# Patient Record
Sex: FEMALE | URBAN - METROPOLITAN AREA
[De-identification: names, ages, dates, MRNs, and addresses within clinical notes are randomized per-mention and may not be internally consistent; named-entity substitution may affect disease eponyms.]

---

## 2022-03-01 ENCOUNTER — HOSPITAL ENCOUNTER (OUTPATIENT)
Age: 67
Discharge: HOME | End: 2022-03-03

## 2022-03-01 PROCEDURE — 88305 TISSUE EXAM BY PATHOLOGIST: CPT

## 2022-03-01 PROCEDURE — 87081 CULTURE SCREEN ONLY: CPT

## 2022-03-01 PROCEDURE — 88342 IMHCHEM/IMCYTCHM 1ST ANTB: CPT

## 2022-03-02 LAB — CLOTEST: NORMAL

## 2023-01-03 ENCOUNTER — HOSPITAL ENCOUNTER (EMERGENCY)
Age: 68
Discharge: HOME | End: 2023-01-03
Payer: MEDICARE

## 2023-01-03 VITALS
SYSTOLIC BLOOD PRESSURE: 158 MMHG | OXYGEN SATURATION: 97 % | HEART RATE: 81 BPM | TEMPERATURE: 97 F | DIASTOLIC BLOOD PRESSURE: 98 MMHG | RESPIRATION RATE: 18 BRPM

## 2023-01-03 DIAGNOSIS — J06.9 URI WITH COUGH AND CONGESTION: Primary | ICD-10-CM

## 2023-01-03 DIAGNOSIS — H10.9 CONJUNCTIVITIS OF RIGHT EYE, UNSPECIFIED CONJUNCTIVITIS TYPE: ICD-10-CM

## 2023-01-03 PROCEDURE — 99211 OFF/OP EST MAY X REQ PHY/QHP: CPT

## 2023-01-03 RX ORDER — AMOXICILLIN AND CLAVULANATE POTASSIUM 875; 125 MG/1; MG/1
1 TABLET, FILM COATED ORAL 2 TIMES DAILY
Qty: 14 TABLET | Refills: 0 | Status: SHIPPED | OUTPATIENT
Start: 2023-01-03 | End: 2023-01-10

## 2023-01-03 RX ORDER — OFLOXACIN 3 MG/ML
2 SOLUTION/ DROPS OPHTHALMIC 4 TIMES DAILY
Qty: 5 ML | Refills: 0 | Status: SHIPPED | OUTPATIENT
Start: 2023-01-03 | End: 2023-01-10

## 2023-01-03 RX ORDER — HYDROCHLOROTHIAZIDE 12.5 MG/1
12.5 CAPSULE, GELATIN COATED ORAL DAILY
COMMUNITY

## 2023-01-03 RX ORDER — PREDNISONE 10 MG/1
TABLET ORAL
Qty: 14 TABLET | Refills: 0 | Status: SHIPPED | OUTPATIENT
Start: 2023-01-03

## 2023-01-03 ASSESSMENT — PAIN SCALES - GENERAL: PAINLEVEL_OUTOF10: 4

## 2023-01-03 ASSESSMENT — VISUAL ACUITY: OU: 1

## 2023-01-03 ASSESSMENT — PAIN DESCRIPTION - LOCATION: LOCATION: HEAD

## 2023-01-03 ASSESSMENT — PAIN - FUNCTIONAL ASSESSMENT: PAIN_FUNCTIONAL_ASSESSMENT: 0-10

## 2023-01-03 NOTE — DISCHARGE INSTRUCTIONS
Robitussin DM for cough and chest congestion  Allergy pill (ie.  Claritin) for runny nose  Stuffy nose : cough drop, saline spray, otc flonase, otc sudafed (use sparingly due to bp and pulse)

## 2023-01-03 NOTE — ED PROVIDER NOTES
[facility]  EMERGENCY DEPARTMENT ENCOUNTER        NAME: Penny Canales  :  1955  MRN:  [id number]  Date of evaluation: 1/3/2023  Provider: Mehdi Dan PA-C  PCP: Jean Carlos Contreras MD  Note Started : 8:25 AM EST 1/3/23    Chief Complaint: Pharyngitis (Headache  congestion right eye was red crusted cough)      This is a 28-year-old female the presents to urgent care complaining of 4-day history of sinus pain and pressure URI symptoms and a cough. She denies chest pain or shortness of breath. No abdominal pain nausea vomiting diarrhea or urinary symptoms. Also complains of redness and discharge to her right eye but denies any loss of vision or blurred vision. She states other people in her family have similar symptoms. On first contact patient she appears to be in no acute distress. Review of Systems  Pertinent positives and negatives are stated within HPI, all other systems reviewed and are negative. Allergies: Patient has no active allergies. --------------------------------------------- PAST HISTORY ---------------------------------------------  Past Medical History:  has no past medical history on file. Past Surgical History:  has a past surgical history that includes hemicolectomy; hernia repair; and joint replacement. Social History:  reports that she has never smoked. She has never used smokeless tobacco.    Family History: family history is not on file. The patients home medications have been reviewed. The nursing notes within the ED encounter have been reviewed.      ------------------------------------------------SCREENINGS----------------------------------------------                        CIWA Assessment  BP: (!) 158/98 (did not take  blood pressure meds today)  Heart Rate: 81           ---------------------------------------------PHYSICAL EXAM --------------------------------------------    Vitals:    23 5956   BP: (!) 158/98   Pulse: 81   Resp: 18   Temp: 97 °F (36.1 °C)   SpO2: 97%     Oxygen Saturation Interpretation: Normal     Physical Exam  Vitals and nursing note reviewed. Constitutional:       Appearance: She is well-developed. HENT:      Head: Normocephalic and atraumatic. Jaw: There is normal jaw occlusion. Right Ear: Hearing and external ear normal. Tympanic membrane is bulging. Left Ear: Hearing and external ear normal. Tympanic membrane is bulging. Nose: Congestion and rhinorrhea present. Right Sinus: Frontal sinus tenderness present. Left Sinus: Frontal sinus tenderness present. Mouth/Throat:      Mouth: Mucous membranes are moist. No angioedema. Pharynx: Uvula midline. Posterior oropharyngeal erythema present. No pharyngeal swelling, oropharyngeal exudate or uvula swelling. Tonsils: No tonsillar exudate. Comments: Oropharynx is patent. Eyes:      General: Lids are normal. Vision grossly intact. Gaze aligned appropriately. No allergic shiner, visual field deficit or scleral icterus. Extraocular Movements: Extraocular movements intact. Conjunctiva/sclera:      Right eye: Right conjunctiva is injected. Pupils: Pupils are equal, round, and reactive to light. Comments: No abrasion flare ulcer foreign body hyphema or hypopyon seen on exam   Cardiovascular:      Rate and Rhythm: Normal rate and regular rhythm. Heart sounds: Normal heart sounds. No murmur heard. Pulmonary:      Effort: Pulmonary effort is normal.      Breath sounds: Normal breath sounds. Abdominal:      General: Bowel sounds are normal.      Palpations: Abdomen is soft. Abdomen is not rigid. Tenderness: There is no abdominal tenderness. There is no guarding or rebound. Musculoskeletal:      Cervical back: Full passive range of motion without pain, normal range of motion and neck supple. Lymphadenopathy:      Cervical: No cervical adenopathy.    Skin:     General: Skin is warm and dry. Findings: No abrasion or rash. Neurological:      General: No focal deficit present. Mental Status: She is alert and oriented to person, place, and time. GCS: GCS eye subscore is 4. GCS verbal subscore is 5. GCS motor subscore is 6. Cranial Nerves: No cranial nerve deficit. Sensory: No sensory deficit. Coordination: Coordination normal.      Gait: Gait normal.       -------------------------------------------------- RESULTS -------------------------------------------------  No results found for this visit on 01/03/23. No orders to display       Labs Reviewed - No data to display    When ordered only abnormal lab results are displayed. All other labs were within normal range or not returned as of this dictation. Interpretation per the Radiologist below, if available at the time of this note:    No orders to display     No results found. No results found.     -------------------------------------------------PROCEDURES--------------------------------------------  Unless otherwise noted below, none      Procedures      ---EMERGENCY DEPARTMENT COURSE and DIFFERENTIAL DIAGNOSIS/MDM---  (CC/HPI Summary, DDx, ED Course, and Reassessment:) (Disposition Considerations (include 1 Tests not done, Admit vs D/C, Shared Decision Making, Pt Expectation of Test or Tx., Consults, Social Determinants, Chronic Conditiions, Records reviewed)    MDM  Number of Diagnoses or Management Options  Conjunctivitis of right eye, unspecified conjunctivitis type  URI with cough and congestion  Diagnosis management comments: This is a 59-year-old female in no acute distress. She has cough and cold symptoms sinus and conjunctivitis symptoms. The symptoms have been going on for the past 4 days.   She has tried some over-the-counter cough and cold medications we discussed over-the-counter medications also I will place her on an antibiotic for possible bacterial cause she will be on Augmentin antibiotic also an antibiotic eyedrop ofloxacin. Instructions given. DISCHARGE MEDICATIONS:  Discharge Medication List as of 1/3/2023  8:40 AM        START taking these medications    Details   ofloxacin (OCUFLOX) 0.3 % solution Place 2 drops into the right eye 4 times daily for 7 days, Disp-5 mL, R-0Normal      amoxicillin-clavulanate (AUGMENTIN) 875-125 MG per tablet Take 1 tablet by mouth 2 times daily for 7 days, Disp-14 tablet, R-0Normal      predniSONE (DELTASONE) 10 MG tablet Take 40mg by mouth daily x 2 days, then 20mg by mouth daily x 2 days then 10mg by mouth daily x 2 days. , Disp-14 tablet, R-0Normal             DISCONTINUED MEDICATIONS:  Discharge Medication List as of 1/3/2023  8:40 AM          PATIENT REFERRED TO:  Rubén Card MD  [facility], [address]  [phone]    Schedule an appointment as soon as possible for a visit       --------------------------------- ADDITIONAL PROVIDER NOTES ---------------------------------  I have spoken with the patient and discussed todays results, in addition to providing specific details for the plan of care and counseling regarding the diagnosis and prognosis. Their questions are answered at this time and they are agreeable with the plan. This patient is stable for discharge. I have shared the specific conditions for return, as well as the importance of follow-up. * NOTE: (Please note that portions of this note were completed with a voice recognition program.  Efforts were made to edit the dictations but occasionally words are mis-transcribed. )    --------------------------------- IMPRESSION AND DISPOSITION ---------------------------------    IMPRESSION  1. URI with cough and congestion    2.  Conjunctivitis of right eye, unspecified conjunctivitis type        DISPOSITION Decision To Discharge 01/03/2023 08:40:18 AM    Disposition: Discharge to home  Patient condition is good    I am the Primary Clinician of Record.      Lucas Olson PA-C (electronically signed) on 1/3/2023 at [address] [place], [place] [address]  01/03/23 1121

## 2024-03-16 ENCOUNTER — HOSPITAL ENCOUNTER (EMERGENCY)
Age: 69
Discharge: HOME | End: 2024-03-16
Payer: MEDICARE

## 2024-03-16 VITALS
HEIGHT: 66 IN | BODY MASS INDEX: 24.11 KG/M2 | DIASTOLIC BLOOD PRESSURE: 84 MMHG | WEIGHT: 150 LBS | SYSTOLIC BLOOD PRESSURE: 142 MMHG | OXYGEN SATURATION: 99 % | HEART RATE: 70 BPM | RESPIRATION RATE: 18 BRPM | TEMPERATURE: 97.7 F

## 2024-03-16 DIAGNOSIS — J06.9 VIRAL URI WITH COUGH: Primary | ICD-10-CM

## 2024-03-16 DIAGNOSIS — B97.89 ACUTE VIRAL SINUSITIS: ICD-10-CM

## 2024-03-16 DIAGNOSIS — J01.90 ACUTE VIRAL SINUSITIS: ICD-10-CM

## 2024-03-16 PROCEDURE — 99211 OFF/OP EST MAY X REQ PHY/QHP: CPT

## 2024-03-16 RX ORDER — CETIRIZINE HYDROCHLORIDE 10 MG/1
10 TABLET ORAL DAILY
Qty: 30 TABLET | Refills: 0 | Status: SHIPPED | OUTPATIENT
Start: 2024-03-16

## 2024-03-16 RX ORDER — FLUTICASONE PROPIONATE 50 MCG
2 SPRAY, SUSPENSION (ML) NASAL DAILY
Qty: 16 G | Refills: 0 | Status: SHIPPED | OUTPATIENT
Start: 2024-03-16

## 2024-03-16 RX ORDER — BENZONATATE 100 MG/1
100 CAPSULE ORAL 3 TIMES DAILY PRN
Qty: 21 CAPSULE | Refills: 0 | Status: SHIPPED | OUTPATIENT
Start: 2024-03-16 | End: 2024-03-23

## 2024-03-16 ASSESSMENT — PAIN - FUNCTIONAL ASSESSMENT: PAIN_FUNCTIONAL_ASSESSMENT: NONE - DENIES PAIN

## 2024-03-16 NOTE — DISCHARGE INSTRUCTIONS
Your symptoms are likely viral or environmental.  Rest and drink plenty of fluid, Tylenol ibuprofen for any pain.  Tessalon Perles 3 times daily as needed for cough, Zyrtec once daily, Flonase nasal spray 2 sprays each nostril daily.  If your symptoms do not improve over the next 1 week please follow-up with your PCP.

## 2024-03-16 NOTE — ED PROVIDER NOTES
[facility]  Department of Emergency Medicine     Encounter Note  Admit Date/RoomTime: 3/16/2024 12:08 PM   Room:     NAME: Giana Torres  : 1955  MRN: [id number]     Chief Complaint:  Cough and Nasal Congestion (Started 5 days ago)    History of Present Illness       Giana Torres is a 68 y.o. old female who presents to the urgent care presents with a 5-day history of nasal congestion, clear rhinorrhea, mild cough.  Patient states on the whole her symptoms have been improving.  States that she wanted to make sure that she was okay with her grandchildren are coming to visit.  Started taking some over-the-counter generic DayQuil yesterday with some improvement in symptoms.  Denies any fevers, chills, mucopurulent sputum production, mucopurulent rhinorrhea, chest pain, shortness of breath.  ROS   Pertinent positives and negatives are stated within HPI, all other systems reviewed and are negative.    Past Medical History:  has no past medical history on file.    Surgical History:  has a past surgical history that includes hemicolectomy; hernia repair; and joint replacement.    Social History:  reports that she has never smoked. She has never used smokeless tobacco. She reports current alcohol use. She reports that she does not use drugs.    Family History: family history is not on file.     Allergies: Patient has no known allergies.    Physical Exam   Oxygen Saturation Interpretation: Normal on room air analysis.        ED Triage Vitals [24 1121]   BP Temp Temp Source Pulse Respirations SpO2 Height Weight - Scale   (!) 142/101 97.7 °F (36.5 °C) Infrared 70 18 99 % 1.676 m (5' 6\") 68 kg (150 lb)         Constitutional:  Alert, development consistent with age.  Ears:  External Ears: Bilateral normal.               TM's & External Canals: normal TM's and external ear canals bilaterally.  Nose:   There is no discharge, swelling or lesions noted.  Sinuses: no Bilateral  Fax request:    Epic Prompts with this message when ordering:     -progesterone (PROMETRIUM) 100 MG capsule cannot be ordered as a prescription.   Zyrtec, Flonase and Tessalon Perles.  Discussed conditions requiring emergent reevaluation in the ER.  Discussed follow-up with PCP.      Assessment     1. Viral URI with cough    2. Acute viral sinusitis      Plan   Discharged home.  Patient condition is stable  Cl Torrez MD  [address], [address]  [facility] [address]  [phone]    Schedule an appointment as soon as possible for a visit in 1 week       New Medications     Discharge Medication List as of 3/16/2024 12:33 PM        START taking these medications    Details   cetirizine (ZYRTEC) 10 MG tablet Take 1 tablet by mouth daily, Disp-30 tablet, R-0Normal      fluticasone (FLONASE) 50 MCG/ACT nasal spray 2 sprays by Each Nostril route daily, Disp-16 g, R-0Normal      benzonatate (TESSALON PERLES) 100 MG capsule Take 1 capsule by mouth 3 times daily as needed for Cough, Disp-21 capsule, R-0Normal           Electronically signed by CYNDIE Cintron CNP   DD: 3/16/24  **This report was transcribed using voice recognition software. Every effort was made to ensure accuracy; however, inadvertent computerized transcription errors may be present.  END OF ED PROVIDER NOTE      Alix Moreno APRN - CNP  03/16/24 9325

## 2024-04-17 ENCOUNTER — APPOINTMENT (OUTPATIENT)
Dept: GENERAL RADIOLOGY | Age: 69
DRG: 603 | End: 2024-04-17
Payer: MEDICARE

## 2024-04-17 ENCOUNTER — HOSPITAL ENCOUNTER (INPATIENT)
Age: 69
LOS: 4 days | Discharge: HOME | DRG: 603 | End: 2024-04-21
Attending: STUDENT IN AN ORGANIZED HEALTH CARE EDUCATION/TRAINING PROGRAM | Admitting: INTERNAL MEDICINE
Payer: MEDICARE

## 2024-04-17 ENCOUNTER — HOSPITAL ENCOUNTER (EMERGENCY)
Age: 69
Discharge: HOME | DRG: 603 | End: 2024-04-17
Payer: MEDICARE

## 2024-04-17 VITALS
WEIGHT: 150 LBS | DIASTOLIC BLOOD PRESSURE: 82 MMHG | BODY MASS INDEX: 24.21 KG/M2 | HEART RATE: 80 BPM | TEMPERATURE: 98.4 F | SYSTOLIC BLOOD PRESSURE: 151 MMHG | RESPIRATION RATE: 16 BRPM | OXYGEN SATURATION: 99 %

## 2024-04-17 DIAGNOSIS — M71.121 SEPTIC OLECRANON BURSITIS OF RIGHT ELBOW: Primary | ICD-10-CM

## 2024-04-17 DIAGNOSIS — L03.119 CELLULITIS OF UPPER EXTREMITY, UNSPECIFIED LATERALITY: Primary | ICD-10-CM

## 2024-04-17 DIAGNOSIS — L03.113 CELLULITIS OF RIGHT UPPER EXTREMITY: ICD-10-CM

## 2024-04-17 PROBLEM — M70.21 OLECRANON BURSITIS OF RIGHT ELBOW: Status: ACTIVE | Noted: 2024-04-17

## 2024-04-17 LAB
ALBUMIN SERPL-MCNC: 4.7 G/DL (ref 3.5–5.2)
ALP SERPL-CCNC: 77 U/L (ref 35–104)
ALT SERPL-CCNC: 21 U/L (ref 0–32)
ANION GAP SERPL CALCULATED.3IONS-SCNC: 14 MMOL/L (ref 7–16)
AST SERPL-CCNC: 32 U/L (ref 0–31)
BASOPHILS # BLD: 0.05 K/UL (ref 0–0.2)
BASOPHILS # BLD: 0.05 K/UL (ref 0–0.2)
BASOPHILS NFR BLD: 0 % (ref 0–2)
BASOPHILS NFR BLD: 0 % (ref 0–2)
BILIRUB SERPL-MCNC: 0.6 MG/DL (ref 0–1.2)
BUN SERPL-MCNC: 9 MG/DL (ref 6–23)
CALCIUM SERPL-MCNC: 9.8 MG/DL (ref 8.6–10.2)
CHLORIDE SERPL-SCNC: 88 MMOL/L (ref 98–107)
CO2 SERPL-SCNC: 27 MMOL/L (ref 22–29)
CREAT SERPL-MCNC: 0.7 MG/DL (ref 0.5–1)
EOSINOPHIL # BLD: 0.01 K/UL (ref 0.05–0.5)
EOSINOPHIL # BLD: 0.01 K/UL (ref 0.05–0.5)
EOSINOPHILS RELATIVE PERCENT: 0 % (ref 0–6)
EOSINOPHILS RELATIVE PERCENT: 0 % (ref 0–6)
ERYTHROCYTE [DISTWIDTH] IN BLOOD BY AUTOMATED COUNT: 13 % (ref 11.5–15)
ERYTHROCYTE [DISTWIDTH] IN BLOOD BY AUTOMATED COUNT: 13 % (ref 11.5–15)
ERYTHROCYTE [SEDIMENTATION RATE] IN BLOOD BY WESTERGREN METHOD: 25 MM/HR (ref 0–20)
GFR SERPL CREATININE-BSD FRML MDRD: 89 ML/MIN/1.73M2
GLUCOSE SERPL-MCNC: 104 MG/DL (ref 74–99)
HCT VFR BLD AUTO: 36.5 % (ref 34–48)
HCT VFR BLD AUTO: 38.5 % (ref 34–48)
HGB BLD-MCNC: 12.5 G/DL (ref 11.5–15.5)
HGB BLD-MCNC: 13.5 G/DL (ref 11.5–15.5)
IMM GRANULOCYTES # BLD AUTO: 0.05 K/UL (ref 0–0.58)
IMM GRANULOCYTES # BLD AUTO: 0.07 K/UL (ref 0–0.58)
IMM GRANULOCYTES NFR BLD: 0 % (ref 0–5)
IMM GRANULOCYTES NFR BLD: 1 % (ref 0–5)
LACTATE BLDV-SCNC: 1.1 MMOL/L (ref 0.5–2.2)
LYMPHOCYTES NFR BLD: 1.51 K/UL (ref 1.5–4)
LYMPHOCYTES NFR BLD: 1.57 K/UL (ref 1.5–4)
LYMPHOCYTES RELATIVE PERCENT: 10 % (ref 20–42)
LYMPHOCYTES RELATIVE PERCENT: 11 % (ref 20–42)
MCH RBC QN AUTO: 30.8 PG (ref 26–35)
MCH RBC QN AUTO: 31.8 PG (ref 26–35)
MCHC RBC AUTO-ENTMCNC: 34.2 G/DL (ref 32–34.5)
MCHC RBC AUTO-ENTMCNC: 35.1 G/DL (ref 32–34.5)
MCV RBC AUTO: 89.9 FL (ref 80–99.9)
MCV RBC AUTO: 90.8 FL (ref 80–99.9)
MONOCYTES NFR BLD: 1.16 K/UL (ref 0.1–0.95)
MONOCYTES NFR BLD: 1.19 K/UL (ref 0.1–0.95)
MONOCYTES NFR BLD: 8 % (ref 2–12)
MONOCYTES NFR BLD: 8 % (ref 2–12)
NEUTROPHILS NFR BLD: 81 % (ref 43–80)
NEUTROPHILS NFR BLD: 81 % (ref 43–80)
NEUTS SEG NFR BLD: 11.81 K/UL (ref 1.8–7.3)
NEUTS SEG NFR BLD: 12.22 K/UL (ref 1.8–7.3)
PLATELET # BLD AUTO: 238 K/UL (ref 130–450)
PLATELET # BLD AUTO: 248 K/UL (ref 130–450)
PMV BLD AUTO: 10 FL (ref 7–12)
PMV BLD AUTO: 9.4 FL (ref 7–12)
POTASSIUM SERPL-SCNC: 3.3 MMOL/L (ref 3.5–5)
PROT SERPL-MCNC: 8.1 G/DL (ref 6.4–8.3)
RBC # BLD AUTO: 4.06 M/UL (ref 3.5–5.5)
RBC # BLD AUTO: 4.24 M/UL (ref 3.5–5.5)
SODIUM SERPL-SCNC: 129 MMOL/L (ref 132–146)
WBC OTHER # BLD: 14.7 K/UL (ref 4.5–11.5)
WBC OTHER # BLD: 15 K/UL (ref 4.5–11.5)

## 2024-04-17 PROCEDURE — 6370000000 HC RX 637 (ALT 250 FOR IP): Performed by: STUDENT IN AN ORGANIZED HEALTH CARE EDUCATION/TRAINING PROGRAM

## 2024-04-17 PROCEDURE — 6370000000 HC RX 637 (ALT 250 FOR IP): Performed by: INTERNAL MEDICINE

## 2024-04-17 PROCEDURE — 36415 COLL VENOUS BLD VENIPUNCTURE: CPT

## 2024-04-17 PROCEDURE — 2580000003 HC RX 258: Performed by: STUDENT IN AN ORGANIZED HEALTH CARE EDUCATION/TRAINING PROGRAM

## 2024-04-17 PROCEDURE — 99211 OFF/OP EST MAY X REQ PHY/QHP: CPT

## 2024-04-17 PROCEDURE — 6360000002 HC RX W HCPCS: Performed by: STUDENT IN AN ORGANIZED HEALTH CARE EDUCATION/TRAINING PROGRAM

## 2024-04-17 PROCEDURE — 99285 EMERGENCY DEPT VISIT HI MDM: CPT

## 2024-04-17 PROCEDURE — 6370000000 HC RX 637 (ALT 250 FOR IP): Performed by: NURSE PRACTITIONER

## 2024-04-17 PROCEDURE — 80053 COMPREHEN METABOLIC PANEL: CPT

## 2024-04-17 PROCEDURE — 87040 BLOOD CULTURE FOR BACTERIA: CPT

## 2024-04-17 PROCEDURE — 1200000000 HC SEMI PRIVATE

## 2024-04-17 PROCEDURE — 85025 COMPLETE CBC W/AUTO DIFF WBC: CPT

## 2024-04-17 PROCEDURE — 84145 PROCALCITONIN (PCT): CPT

## 2024-04-17 PROCEDURE — 86140 C-REACTIVE PROTEIN: CPT

## 2024-04-17 PROCEDURE — 73080 X-RAY EXAM OF ELBOW: CPT

## 2024-04-17 PROCEDURE — 83605 ASSAY OF LACTIC ACID: CPT

## 2024-04-17 PROCEDURE — 85652 RBC SED RATE AUTOMATED: CPT

## 2024-04-17 RX ORDER — POTASSIUM CHLORIDE 20 MEQ/1
40 TABLET, EXTENDED RELEASE ORAL PRN
Status: DISCONTINUED | OUTPATIENT
Start: 2024-04-17 | End: 2024-04-21 | Stop reason: HOSPADM

## 2024-04-17 RX ORDER — POTASSIUM CHLORIDE 7.45 MG/ML
10 INJECTION INTRAVENOUS PRN
Status: DISCONTINUED | OUTPATIENT
Start: 2024-04-17 | End: 2024-04-21 | Stop reason: HOSPADM

## 2024-04-17 RX ORDER — SODIUM CHLORIDE 0.9 % (FLUSH) 0.9 %
5-40 SYRINGE (ML) INJECTION PRN
Status: DISCONTINUED | OUTPATIENT
Start: 2024-04-17 | End: 2024-04-21 | Stop reason: HOSPADM

## 2024-04-17 RX ORDER — 0.9 % SODIUM CHLORIDE 0.9 %
1000 INTRAVENOUS SOLUTION INTRAVENOUS ONCE
Status: COMPLETED | OUTPATIENT
Start: 2024-04-17 | End: 2024-04-17

## 2024-04-17 RX ORDER — ENOXAPARIN SODIUM 100 MG/ML
40 INJECTION SUBCUTANEOUS DAILY
Status: DISCONTINUED | OUTPATIENT
Start: 2024-04-18 | End: 2024-04-21 | Stop reason: HOSPADM

## 2024-04-17 RX ORDER — SODIUM CHLORIDE 0.9 % (FLUSH) 0.9 %
5-40 SYRINGE (ML) INJECTION EVERY 12 HOURS SCHEDULED
Status: DISCONTINUED | OUTPATIENT
Start: 2024-04-17 | End: 2024-04-21 | Stop reason: HOSPADM

## 2024-04-17 RX ORDER — ESCITALOPRAM OXALATE 10 MG/1
10 TABLET ORAL DAILY
Status: DISCONTINUED | OUTPATIENT
Start: 2024-04-18 | End: 2024-04-21 | Stop reason: HOSPADM

## 2024-04-17 RX ORDER — ONDANSETRON 4 MG/1
4 TABLET, ORALLY DISINTEGRATING ORAL EVERY 8 HOURS PRN
Status: DISCONTINUED | OUTPATIENT
Start: 2024-04-17 | End: 2024-04-21 | Stop reason: HOSPADM

## 2024-04-17 RX ORDER — ACETAMINOPHEN 325 MG/1
650 TABLET ORAL ONCE
Status: COMPLETED | OUTPATIENT
Start: 2024-04-17 | End: 2024-04-17

## 2024-04-17 RX ORDER — MAGNESIUM SULFATE IN WATER 40 MG/ML
2000 INJECTION, SOLUTION INTRAVENOUS PRN
Status: DISCONTINUED | OUTPATIENT
Start: 2024-04-17 | End: 2024-04-21 | Stop reason: HOSPADM

## 2024-04-17 RX ORDER — SODIUM CHLORIDE 9 MG/ML
INJECTION, SOLUTION INTRAVENOUS PRN
Status: DISCONTINUED | OUTPATIENT
Start: 2024-04-17 | End: 2024-04-21 | Stop reason: HOSPADM

## 2024-04-17 RX ORDER — ONDANSETRON 2 MG/ML
4 INJECTION INTRAMUSCULAR; INTRAVENOUS EVERY 6 HOURS PRN
Status: DISCONTINUED | OUTPATIENT
Start: 2024-04-17 | End: 2024-04-21 | Stop reason: HOSPADM

## 2024-04-17 RX ORDER — AMLODIPINE BESYLATE 5 MG/1
5 TABLET ORAL DAILY
Status: DISCONTINUED | OUTPATIENT
Start: 2024-04-18 | End: 2024-04-21 | Stop reason: HOSPADM

## 2024-04-17 RX ORDER — ACETAMINOPHEN 650 MG/1
650 SUPPOSITORY RECTAL EVERY 6 HOURS PRN
Status: DISCONTINUED | OUTPATIENT
Start: 2024-04-17 | End: 2024-04-21 | Stop reason: HOSPADM

## 2024-04-17 RX ORDER — POLYETHYLENE GLYCOL 3350 17 G/17G
17 POWDER, FOR SOLUTION ORAL DAILY PRN
Status: DISCONTINUED | OUTPATIENT
Start: 2024-04-17 | End: 2024-04-21 | Stop reason: HOSPADM

## 2024-04-17 RX ORDER — POTASSIUM CHLORIDE 20 MEQ/1
40 TABLET, EXTENDED RELEASE ORAL ONCE
Status: COMPLETED | OUTPATIENT
Start: 2024-04-17 | End: 2024-04-17

## 2024-04-17 RX ORDER — ACETAMINOPHEN 325 MG/1
650 TABLET ORAL EVERY 6 HOURS PRN
Status: DISCONTINUED | OUTPATIENT
Start: 2024-04-17 | End: 2024-04-21 | Stop reason: HOSPADM

## 2024-04-17 RX ADMIN — CEFAZOLIN 2000 MG: 2 INJECTION, POWDER, FOR SOLUTION INTRAMUSCULAR; INTRAVENOUS at 22:10

## 2024-04-17 RX ADMIN — SODIUM CHLORIDE 1000 ML: 9 INJECTION, SOLUTION INTRAVENOUS at 22:15

## 2024-04-17 RX ADMIN — ACETAMINOPHEN 650 MG: 325 TABLET ORAL at 18:50

## 2024-04-17 RX ADMIN — ACETAMINOPHEN 650 MG: 325 TABLET ORAL at 23:00

## 2024-04-17 RX ADMIN — VANCOMYCIN HYDROCHLORIDE 1250 MG: 10 INJECTION, POWDER, LYOPHILIZED, FOR SOLUTION INTRAVENOUS at 22:11

## 2024-04-17 RX ADMIN — POTASSIUM CHLORIDE 40 MEQ: 1500 TABLET, EXTENDED RELEASE ORAL at 22:15

## 2024-04-17 ASSESSMENT — PAIN DESCRIPTION - PAIN TYPE
TYPE: ACUTE PAIN
TYPE: ACUTE PAIN

## 2024-04-17 ASSESSMENT — PAIN SCALES - GENERAL
PAINLEVEL_OUTOF10: 4
PAINLEVEL_OUTOF10: 3
PAINLEVEL_OUTOF10: 5
PAINLEVEL_OUTOF10: 1

## 2024-04-17 ASSESSMENT — PAIN DESCRIPTION - LOCATION
LOCATION: ELBOW

## 2024-04-17 ASSESSMENT — PAIN DESCRIPTION - ORIENTATION
ORIENTATION: RIGHT

## 2024-04-17 ASSESSMENT — PAIN DESCRIPTION - DESCRIPTORS
DESCRIPTORS: DISCOMFORT
DESCRIPTORS: ACHING;DISCOMFORT

## 2024-04-17 ASSESSMENT — LIFESTYLE VARIABLES
HOW MANY STANDARD DRINKS CONTAINING ALCOHOL DO YOU HAVE ON A TYPICAL DAY: 1 OR 2
HOW OFTEN DO YOU HAVE A DRINK CONTAINING ALCOHOL: MONTHLY OR LESS

## 2024-04-17 ASSESSMENT — PAIN - FUNCTIONAL ASSESSMENT
PAIN_FUNCTIONAL_ASSESSMENT: 0-10
PAIN_FUNCTIONAL_ASSESSMENT: ACTIVITIES ARE NOT PREVENTED
PAIN_FUNCTIONAL_ASSESSMENT: 0-10

## 2024-04-17 NOTE — ED PROVIDER NOTES
Department of Emergency Medicine  [facility] [place]  Provider Note  Admit Date/Time: 2024  3:12 PM  Room: TERRELL/TERRELL  NAME: Giana Torres  : 1955  MRN: [id number]     Chief Complaint:  Joint Swelling (RIGHT ELBOW RED, SWOLLEN, PAINFUL STARTED ABOUT 3 DAYS AGO)    History of Present Illness        Giana Torres is a 68 y.o. female who has a past medical history of: History reviewed. No pertinent past medical history. presents to the urgent care center by private car for evaluation she is having redness and swelling in pain to the right elbow it started about 3 days ago and the redness seems to be increasing.  She says she feels like she is not feeling well and that she is getting sick with this.  There was no injury to her arm and she said she does not lean on her elbow  ROS    Pertinent positives and negatives are stated within HPI, all other systems reviewed and are negative.    Past Surgical History:   Procedure Laterality Date    HEMICOLECTOMY      HERNIA REPAIR      JOINT REPLACEMENT     Social History:  reports that she has never smoked. She has never used smokeless tobacco. She reports current alcohol use. She reports that she does not use drugs.  Family History: family history is not on file.  Allergies: Patient has no known allergies.    Physical Exam   Oxygen Saturation Interpretation: Normal.   ED Triage Vitals   BP Temp Temp src Pulse Respirations SpO2 Height Weight - Scale   24 1513 24 1513 -- 24 1513 24 1513 24 1513 -- 24 1512   (!) 151/82 98.4 °F (36.9 °C)  80 16 99 %  68 kg (150 lb)       Physical Exam  Constitutional/General: Alert and oriented x3, well appearing, non toxic in NAD  HEENT:  NC/NT.   Airway patent.  Neck: Supple, full ROM,  Respiratory: Lungs clear to auscultation bilaterally, no wheezes, rales, or rhonchi. Not in respiratory distress  CV:  Regular rate. Regular rhythm.   Chest: No chest wall  tenderness  Musculoskeletal: Moves all extremities x 4.  She can fully flex and extend her elbow.  She does have some swelling at the olecranon and arm also she has erythema and warmth above and below the olecranon area approximately 10 cm both ways integument: skin warm and dry. No rashes.   Lymphatic: no lymphadenopathy noted  Neurologic: GCS 15, no focal deficits, symmetric strength 5/5 in the upper and lower extremities bilaterally  Psychiatric: Normal Affect    Lab / Imaging Results   (All laboratory and radiology results have been personally reviewed by myself)  Labs:  Results for orders placed or performed during the hospital encounter of 04/17/24   CBC with Auto Differential   Result Value Ref Range    WBC 14.7 (H) 4.5 - 11.5 k/uL    RBC 4.06 3.50 - 5.50 m/uL    Hemoglobin 12.5 11.5 - 15.5 g/dL    Hematocrit 36.5 34.0 - 48.0 %    MCV 89.9 80.0 - 99.9 fL    MCH 30.8 26.0 - 35.0 pg    MCHC 34.2 32.0 - 34.5 g/dL    RDW 13.0 11.5 - 15.0 %    Platelets 238 130 - 450 k/uL    MPV 9.4 7.0 - 12.0 fL    Neutrophils % 81 (H) 43.0 - 80.0 %    Lymphocytes % 11 (L) 20.0 - 42.0 %    Monocytes % 8 2.0 - 12.0 %    Eosinophils % 0 0 - 6 %    Basophils % 0 0.0 - 2.0 %    Immature Granulocytes % 0 0.0 - 5.0 %    Neutrophils Absolute 11.81 (H) 1.80 - 7.30 k/uL    Lymphocytes Absolute 1.57 1.50 - 4.00 k/uL    Monocytes Absolute 1.19 (H) 0.10 - 0.95 k/uL    Eosinophils Absolute 0.01 (L) 0.05 - 0.50 k/uL    Basophils Absolute 0.05 0.00 - 0.20 k/uL    Immature Granulocytes Absolute 0.05 0.00 - 0.58 k/uL     Imaging:  All Radiology results interpreted by Radiologist unless otherwise noted.  XR ELBOW RIGHT (MIN 3 VIEWS)   Final Result   1. Mild swelling of the soft tissues dorsal to the olecranon, suggesting   cellulitis or possibly olecranon bursitis.   2. No sign of acute osseous injury.             ED Course / Medical Decision Making   Medications - No data to display       Consult(s):   None        MDM:   Patient's elbow is red

## 2024-04-18 PROBLEM — E87.6 HYPOKALEMIA: Status: ACTIVE | Noted: 2024-04-18

## 2024-04-18 PROBLEM — E87.1 HYPONATREMIA: Status: ACTIVE | Noted: 2024-04-18

## 2024-04-18 PROBLEM — I10 HYPERTENSION: Status: ACTIVE | Noted: 2024-04-18

## 2024-04-18 PROBLEM — F32.A DEPRESSION: Status: ACTIVE | Noted: 2024-04-18

## 2024-04-18 PROBLEM — D72.829 LEUKOCYTOSIS: Status: ACTIVE | Noted: 2024-04-18

## 2024-04-18 LAB
BASOPHILS # BLD: 0.03 K/UL (ref 0–0.2)
BASOPHILS NFR BLD: 0 % (ref 0–2)
CRP SERPL HS-MCNC: 172 MG/L (ref 0–5)
EOSINOPHIL # BLD: 0.07 K/UL (ref 0.05–0.5)
EOSINOPHILS RELATIVE PERCENT: 1 % (ref 0–6)
ERYTHROCYTE [DISTWIDTH] IN BLOOD BY AUTOMATED COUNT: 13.1 % (ref 11.5–15)
HCT VFR BLD AUTO: 33.6 % (ref 34–48)
HGB BLD-MCNC: 11.3 G/DL (ref 11.5–15.5)
IMM GRANULOCYTES # BLD AUTO: 0.07 K/UL (ref 0–0.58)
IMM GRANULOCYTES NFR BLD: 1 % (ref 0–5)
LYMPHOCYTES NFR BLD: 1.71 K/UL (ref 1.5–4)
LYMPHOCYTES RELATIVE PERCENT: 12 % (ref 20–42)
MCH RBC QN AUTO: 30.8 PG (ref 26–35)
MCHC RBC AUTO-ENTMCNC: 33.6 G/DL (ref 32–34.5)
MCV RBC AUTO: 91.6 FL (ref 80–99.9)
MONOCYTES NFR BLD: 1.42 K/UL (ref 0.1–0.95)
MONOCYTES NFR BLD: 10 % (ref 2–12)
NEUTROPHILS NFR BLD: 77 % (ref 43–80)
NEUTS SEG NFR BLD: 11.21 K/UL (ref 1.8–7.3)
PLATELET # BLD AUTO: 209 K/UL (ref 130–450)
PMV BLD AUTO: 10 FL (ref 7–12)
PROCALCITONIN SERPL-MCNC: 0.19 NG/ML (ref 0–0.08)
RBC # BLD AUTO: 3.67 M/UL (ref 3.5–5.5)
URATE SERPL-MCNC: 3.3 MG/DL (ref 2.4–5.7)
WBC OTHER # BLD: 14.5 K/UL (ref 4.5–11.5)

## 2024-04-18 PROCEDURE — 85025 COMPLETE CBC W/AUTO DIFF WBC: CPT

## 2024-04-18 PROCEDURE — 97161 PT EVAL LOW COMPLEX 20 MIN: CPT | Performed by: PHYSICAL THERAPIST

## 2024-04-18 PROCEDURE — 1200000000 HC SEMI PRIVATE

## 2024-04-18 PROCEDURE — 6370000000 HC RX 637 (ALT 250 FOR IP): Performed by: INTERNAL MEDICINE

## 2024-04-18 PROCEDURE — 6360000002 HC RX W HCPCS: Performed by: INTERNAL MEDICINE

## 2024-04-18 PROCEDURE — 2580000003 HC RX 258: Performed by: INTERNAL MEDICINE

## 2024-04-18 PROCEDURE — 36415 COLL VENOUS BLD VENIPUNCTURE: CPT

## 2024-04-18 PROCEDURE — 84550 ASSAY OF BLOOD/URIC ACID: CPT

## 2024-04-18 RX ORDER — MELOXICAM 15 MG/1
15 TABLET ORAL DAILY
COMMUNITY

## 2024-04-18 RX ADMIN — Medication 10 ML: at 21:05

## 2024-04-18 RX ADMIN — AMLODIPINE BESYLATE 5 MG: 5 TABLET ORAL at 08:51

## 2024-04-18 RX ADMIN — ACETAMINOPHEN 650 MG: 325 TABLET ORAL at 12:54

## 2024-04-18 RX ADMIN — CEFAZOLIN 1000 MG: 1 INJECTION, POWDER, FOR SOLUTION INTRAMUSCULAR; INTRAVENOUS at 12:55

## 2024-04-18 RX ADMIN — ACETAMINOPHEN 650 MG: 325 TABLET ORAL at 21:19

## 2024-04-18 RX ADMIN — CEFAZOLIN 1000 MG: 1 INJECTION, POWDER, FOR SOLUTION INTRAMUSCULAR; INTRAVENOUS at 06:00

## 2024-04-18 RX ADMIN — CEFAZOLIN 1000 MG: 1 INJECTION, POWDER, FOR SOLUTION INTRAMUSCULAR; INTRAVENOUS at 21:05

## 2024-04-18 RX ADMIN — ACETAMINOPHEN 650 MG: 325 TABLET ORAL at 05:59

## 2024-04-18 RX ADMIN — ESCITALOPRAM OXALATE 10 MG: 10 TABLET ORAL at 08:51

## 2024-04-18 ASSESSMENT — PAIN SCALES - GENERAL
PAINLEVEL_OUTOF10: 2
PAINLEVEL_OUTOF10: 5

## 2024-04-18 ASSESSMENT — PAIN DESCRIPTION - LOCATION: LOCATION: ELBOW

## 2024-04-18 NOTE — ED PROVIDER NOTES
Or   potassium bicarb-citric acid (EFFER-K) effervescent tablet 40 mEq (has no administration in time range)     Or   potassium chloride 10 mEq/100 mL IVPB (Peripheral Line) (has no administration in time range)   potassium chloride 10 mEq/100 mL IVPB (Peripheral Line) (has no administration in time range)   magnesium sulfate 2000 mg in 50 mL IVPB premix (has no administration in time range)   enoxaparin (LOVENOX) injection 40 mg (has no administration in time range)   ondansetron (ZOFRAN-ODT) disintegrating tablet 4 mg (has no administration in time range)     Or   ondansetron (ZOFRAN) injection 4 mg (has no administration in time range)   polyethylene glycol (GLYCOLAX) packet 17 g (has no administration in time range)   acetaminophen (TYLENOL) tablet 650 mg (650 mg Oral Given 4/17/24 2300)     Or   acetaminophen (TYLENOL) suppository 650 mg ( Rectal See Alternative 4/17/24 2300)   ceFAZolin (ANCEF) 1,000 mg in sterile water 10 mL IV syringe (has no administration in time range)   acetaminophen (TYLENOL) tablet 650 mg (650 mg Oral Given 4/17/24 1850)   potassium chloride (KLOR-CON M) extended release tablet 40 mEq (40 mEq Oral Given 4/17/24 2215)   sodium chloride 0.9 % bolus 1,000 mL (1,000 mLs IntraVENous New Bag 4/17/24 2215)   ceFAZolin (ANCEF) 2,000 mg in sterile water 20 mL IV syringe (2,000 mg IntraVENous Given 4/17/24 2210)         Medical Decision Making:     Giana Torres is a 68 y.o. female presenting to the ED for evaluation of redness and swelling to her right elbow for the past 3 days.  She states initially it was just slightly sore and then became red, warm to the touch and swollen.  She reports having a fever last night.  States she went to urgent care today and was advised to come to the emergency department for further workup.  Patient denies any injury or trauma.  She is not diabetic.  She does not feel systemically ill.  States that her arm is just sore and she had the fever last evening.

## 2024-04-18 NOTE — H&P
Department of Internal Medicine  History and Physical    PCP: Dr. Cl Torrez  Admitting Physician: Dr. Alfa Ferreira  Consultants:  Orthopedic surgery      CHIEF COMPLAINT:  redness and swelling right elbow    HISTORY OF PRESENT ILLNESS:    This is a 68 year old female patient with no significant medical history except for hypertension and depression.  She was brought in with complaints of redness and swelling to the right elbow for the past few days.  She states she had a fever yesterday that she was treating with tylenol.  The labs today show an elevated white blood cell count, sed rate, procal, and crp.  She has a low sodium and low potassium level.  The xray of the elbow shows mild swelling of the soft tissues dorsal to the olecranon, suggesting cellulitis or possibly olecranon bursitis.  At this time she is being admitted for evaluation and treatment.  Ortho will be consulted.      PAST MEDICAL Hx:  Hypertension  depression    PAST SURGICAL Hx:   Past Surgical History:   Procedure Laterality Date    HEMICOLECTOMY      HERNIA REPAIR      EXPLORATORY LAPAROTOMY WITH HERNIA REPAIR      JOINT REPLACEMENT         FAMILY Hx:  History reviewed. No pertinent family history.    HOME MEDICATIONS:  Prior to Admission medications    Medication Sig Start Date End Date Taking? Authorizing Provider   cetirizine (ZYRTEC) 10 MG tablet Take 1 tablet by mouth daily  Patient not taking: Reported on 4/17/2024 3/16/24   Alix Moreno APRN - CNP   fluticasone (FLONASE) 50 MCG/ACT nasal spray 2 sprays by Each Nostril route daily  Patient not taking: Reported on 4/17/2024 3/16/24   Alix Moreno APRN - CNP   hydroCHLOROthiazide (MICROZIDE) 12.5 MG capsule Take 1 capsule by mouth daily    ProviderArcadio MD   Escitalopram Oxalate (LEXAPRO PO) Take by mouth    ProviderArcadio MD       ALLERGIES:  Patient has no known allergies.    SOCIAL Hx:  Social History     Socioeconomic History    Marital status:

## 2024-04-18 NOTE — CARE COORDINATION
CM note: Pt presented with cellulitis of the right arm.  Evaluated by ortho.  Pt is currently on IV Ancef q8h.  No anticipated discharge needs at this time but will follow for antibiotics.  Pt lives with spouse and is independent at baseline.  Plan for patient to return home when medically appropriate.

## 2024-04-18 NOTE — CONSULTS
Orthopedics consulted to rule out right olecranon bursitis.  X-rays of right elbow reviewed demonstrating no acute fractures dislocations, there is mild soft tissue swelling appreciated on the dorsal aspect of the right arm with soft tissue changes consistent with cellulitis.  Patient does have an increased white blood cell count of 15, ESR elevated at 25, CRP is pending.  Formal physical exam to follow.      Electronically signed by Rakesh Barnes DO on 4/18/2024 at 12:07 AM   
complete range of motion without pain  Upper arm/forearm compartments soft and compressible  AIN/PIN/ulnar nerve function grossly intact  2+/4 radial pulse, hand warm well-perfused  Sensation grossly intact to radial/ulnar/median/axillary nerve distributions to extremity  Remainder of extremity atraumatic                Secondary Exam:   leftUE: No obvious signs of trauma.  -TTP to fingers, hand, wrist, forearm, elbow, humerus, shoulder or clavicle.-- Patient able to flex/extend fingers, wrist, elbow and shoulder with active and passive ROM without pain, +2/4 Radial pulse, compartments soft and compressible.    bilateralLE: No obvious signs of trauma.   -TTP to foot, ankle, leg, knee, thigh, hip.-- Patient able to flex/extend toes, ankle, knee and hip with active and passive ROM without pain,+2/4 DP & PT pulses, compartments soft and compressible.      DATA:    CBC:   Lab Results   Component Value Date/Time    WBC 14.5 04/18/2024 03:16 AM    RBC 3.67 04/18/2024 03:16 AM    HGB 11.3 04/18/2024 03:16 AM    HCT 33.6 04/18/2024 03:16 AM    MCV 91.6 04/18/2024 03:16 AM    MCH 30.8 04/18/2024 03:16 AM    MCHC 33.6 04/18/2024 03:16 AM    RDW 13.1 04/18/2024 03:16 AM     04/18/2024 03:16 AM    MPV 10.0 04/18/2024 03:16 AM     PT/INR:  No results found for: \"PROTIME\", \"INR\"    Radiology Review:  X-ray-multiple views right elbow demonstrate no acute fractures dislocations, mild edema noted about soft tissue    IMPRESSION:  Right elbow cellulitis    PLAN:  Weight-bear as tolerated right upper extremity  Physical exam and imaging findings reviewed with patient demonstrating low concern for septic joint.  Discussed with patient concern for superficial cellulitis, as well as possible for olecranon bursitis  No acute orthopedic surgery intervention at this time, no indication for joint aspiration at this time secondary to full range of motion, no pain through active or passive range of motion arc  Would recommend to

## 2024-04-19 LAB
ALBUMIN SERPL-MCNC: 3.3 G/DL (ref 3.5–5.2)
ALP SERPL-CCNC: 60 U/L (ref 35–104)
ALT SERPL-CCNC: 12 U/L (ref 0–32)
ANION GAP SERPL CALCULATED.3IONS-SCNC: 13 MMOL/L (ref 7–16)
AST SERPL-CCNC: 22 U/L (ref 0–31)
BILIRUB SERPL-MCNC: 0.3 MG/DL (ref 0–1.2)
BUN SERPL-MCNC: 5 MG/DL (ref 6–23)
CALCIUM SERPL-MCNC: 8.8 MG/DL (ref 8.6–10.2)
CHLORIDE SERPL-SCNC: 105 MMOL/L (ref 98–107)
CO2 SERPL-SCNC: 19 MMOL/L (ref 22–29)
CREAT SERPL-MCNC: 0.6 MG/DL (ref 0.5–1)
ERYTHROCYTE [DISTWIDTH] IN BLOOD BY AUTOMATED COUNT: 13.2 % (ref 11.5–15)
GFR SERPL CREATININE-BSD FRML MDRD: >90 ML/MIN/1.73M2
GLUCOSE SERPL-MCNC: 95 MG/DL (ref 74–99)
HCT VFR BLD AUTO: 32.9 % (ref 34–48)
HGB BLD-MCNC: 11.1 G/DL (ref 11.5–15.5)
MCH RBC QN AUTO: 31.6 PG (ref 26–35)
MCHC RBC AUTO-ENTMCNC: 33.7 G/DL (ref 32–34.5)
MCV RBC AUTO: 93.7 FL (ref 80–99.9)
PLATELET # BLD AUTO: 190 K/UL (ref 130–450)
PMV BLD AUTO: 10.1 FL (ref 7–12)
POTASSIUM SERPL-SCNC: 3.7 MMOL/L (ref 3.5–5)
PROT SERPL-MCNC: 6.4 G/DL (ref 6.4–8.3)
RBC # BLD AUTO: 3.51 M/UL (ref 3.5–5.5)
SODIUM SERPL-SCNC: 137 MMOL/L (ref 132–146)
WBC OTHER # BLD: 12.2 K/UL (ref 4.5–11.5)

## 2024-04-19 PROCEDURE — 2580000003 HC RX 258: Performed by: INTERNAL MEDICINE

## 2024-04-19 PROCEDURE — 36415 COLL VENOUS BLD VENIPUNCTURE: CPT

## 2024-04-19 PROCEDURE — 85027 COMPLETE CBC AUTOMATED: CPT

## 2024-04-19 PROCEDURE — 80053 COMPREHEN METABOLIC PANEL: CPT

## 2024-04-19 PROCEDURE — 6360000002 HC RX W HCPCS: Performed by: INTERNAL MEDICINE

## 2024-04-19 PROCEDURE — 6370000000 HC RX 637 (ALT 250 FOR IP): Performed by: INTERNAL MEDICINE

## 2024-04-19 PROCEDURE — 1200000000 HC SEMI PRIVATE

## 2024-04-19 RX ADMIN — ESCITALOPRAM OXALATE 10 MG: 10 TABLET ORAL at 08:21

## 2024-04-19 RX ADMIN — ACETAMINOPHEN 650 MG: 325 TABLET ORAL at 16:38

## 2024-04-19 RX ADMIN — Medication 10 ML: at 21:24

## 2024-04-19 RX ADMIN — ACETAMINOPHEN 650 MG: 325 TABLET ORAL at 21:27

## 2024-04-19 RX ADMIN — CEFAZOLIN 1000 MG: 1 INJECTION, POWDER, FOR SOLUTION INTRAMUSCULAR; INTRAVENOUS at 21:31

## 2024-04-19 RX ADMIN — CEFAZOLIN 1000 MG: 1 INJECTION, POWDER, FOR SOLUTION INTRAMUSCULAR; INTRAVENOUS at 14:14

## 2024-04-19 RX ADMIN — ACETAMINOPHEN 650 MG: 325 TABLET ORAL at 09:40

## 2024-04-19 RX ADMIN — CEFAZOLIN 1000 MG: 1 INJECTION, POWDER, FOR SOLUTION INTRAMUSCULAR; INTRAVENOUS at 05:37

## 2024-04-19 RX ADMIN — ACETAMINOPHEN 650 MG: 325 TABLET ORAL at 05:38

## 2024-04-19 ASSESSMENT — PAIN DESCRIPTION - ORIENTATION
ORIENTATION: RIGHT
ORIENTATION: RIGHT

## 2024-04-19 ASSESSMENT — PAIN SCALES - GENERAL
PAINLEVEL_OUTOF10: 4
PAINLEVEL_OUTOF10: 2
PAINLEVEL_OUTOF10: 4

## 2024-04-19 ASSESSMENT — PAIN DESCRIPTION - DESCRIPTORS
DESCRIPTORS: ACHING
DESCRIPTORS: ACHING;JABBING;DULL

## 2024-04-19 ASSESSMENT — PAIN DESCRIPTION - LOCATION
LOCATION: ELBOW
LOCATION: ARM

## 2024-04-19 NOTE — PLAN OF CARE
Problem: Discharge Planning  Goal: Discharge to home or other facility with appropriate resources  4/19/2024 0201 by Shannan Mckeon RN  Outcome: Progressing     Problem: Pain  Goal: Verbalizes/displays adequate comfort level or baseline comfort level  4/19/2024 0201 by Shannan Mckeon RN  Outcome: Progressing     Problem: Safety - Adult  Goal: Free from fall injury  4/19/2024 0201 by Shannan Mckeon RN  Outcome: Progressing     Problem: Chronic Conditions and Co-morbidities  Goal: Patient's chronic conditions and co-morbidity symptoms are monitored and maintained or improved  4/19/2024 0201 by Shannan Mckeon RN  Outcome: Progressing

## 2024-04-20 LAB
ALBUMIN SERPL-MCNC: 3.8 G/DL (ref 3.5–5.2)
ALP SERPL-CCNC: 66 U/L (ref 35–104)
ALT SERPL-CCNC: 10 U/L (ref 0–32)
ANION GAP SERPL CALCULATED.3IONS-SCNC: 10 MMOL/L (ref 7–16)
AST SERPL-CCNC: 17 U/L (ref 0–31)
BILIRUB SERPL-MCNC: 0.2 MG/DL (ref 0–1.2)
BUN SERPL-MCNC: 7 MG/DL (ref 6–23)
CALCIUM SERPL-MCNC: 8.8 MG/DL (ref 8.6–10.2)
CHLORIDE SERPL-SCNC: 106 MMOL/L (ref 98–107)
CO2 SERPL-SCNC: 26 MMOL/L (ref 22–29)
CREAT SERPL-MCNC: 0.6 MG/DL (ref 0.5–1)
ERYTHROCYTE [DISTWIDTH] IN BLOOD BY AUTOMATED COUNT: 13.2 % (ref 11.5–15)
GFR SERPL CREATININE-BSD FRML MDRD: >90 ML/MIN/1.73M2
GLUCOSE SERPL-MCNC: 88 MG/DL (ref 74–99)
HCT VFR BLD AUTO: 32.3 % (ref 34–48)
HGB BLD-MCNC: 10.7 G/DL (ref 11.5–15.5)
MCH RBC QN AUTO: 30.7 PG (ref 26–35)
MCHC RBC AUTO-ENTMCNC: 33.1 G/DL (ref 32–34.5)
MCV RBC AUTO: 92.6 FL (ref 80–99.9)
PLATELET # BLD AUTO: 258 K/UL (ref 130–450)
PMV BLD AUTO: 10 FL (ref 7–12)
POTASSIUM SERPL-SCNC: 4.1 MMOL/L (ref 3.5–5)
PROT SERPL-MCNC: 6.5 G/DL (ref 6.4–8.3)
RBC # BLD AUTO: 3.49 M/UL (ref 3.5–5.5)
SODIUM SERPL-SCNC: 142 MMOL/L (ref 132–146)
WBC OTHER # BLD: 7.6 K/UL (ref 4.5–11.5)

## 2024-04-20 PROCEDURE — 36415 COLL VENOUS BLD VENIPUNCTURE: CPT

## 2024-04-20 PROCEDURE — 1200000000 HC SEMI PRIVATE

## 2024-04-20 PROCEDURE — 6370000000 HC RX 637 (ALT 250 FOR IP): Performed by: INTERNAL MEDICINE

## 2024-04-20 PROCEDURE — 80053 COMPREHEN METABOLIC PANEL: CPT

## 2024-04-20 PROCEDURE — 2580000003 HC RX 258: Performed by: INTERNAL MEDICINE

## 2024-04-20 PROCEDURE — 6360000002 HC RX W HCPCS: Performed by: INTERNAL MEDICINE

## 2024-04-20 PROCEDURE — 85027 COMPLETE CBC AUTOMATED: CPT

## 2024-04-20 RX ADMIN — CEFAZOLIN 1000 MG: 1 INJECTION, POWDER, FOR SOLUTION INTRAMUSCULAR; INTRAVENOUS at 22:32

## 2024-04-20 RX ADMIN — ACETAMINOPHEN 650 MG: 325 TABLET ORAL at 06:28

## 2024-04-20 RX ADMIN — AMLODIPINE BESYLATE 5 MG: 5 TABLET ORAL at 10:06

## 2024-04-20 RX ADMIN — ACETAMINOPHEN 650 MG: 325 TABLET ORAL at 22:35

## 2024-04-20 RX ADMIN — Medication 10 ML: at 21:00

## 2024-04-20 RX ADMIN — ACETAMINOPHEN 650 MG: 325 TABLET ORAL at 14:59

## 2024-04-20 RX ADMIN — CEFAZOLIN 1000 MG: 1 INJECTION, POWDER, FOR SOLUTION INTRAMUSCULAR; INTRAVENOUS at 14:49

## 2024-04-20 RX ADMIN — Medication 10 ML: at 14:50

## 2024-04-20 RX ADMIN — CEFAZOLIN 1000 MG: 1 INJECTION, POWDER, FOR SOLUTION INTRAMUSCULAR; INTRAVENOUS at 06:20

## 2024-04-20 RX ADMIN — ESCITALOPRAM OXALATE 10 MG: 10 TABLET ORAL at 10:05

## 2024-04-20 ASSESSMENT — PAIN DESCRIPTION - ORIENTATION
ORIENTATION: RIGHT

## 2024-04-20 ASSESSMENT — PAIN DESCRIPTION - LOCATION
LOCATION: ELBOW

## 2024-04-20 ASSESSMENT — PAIN DESCRIPTION - DESCRIPTORS
DESCRIPTORS: ACHING
DESCRIPTORS: DISCOMFORT

## 2024-04-20 ASSESSMENT — PAIN SCALES - GENERAL
PAINLEVEL_OUTOF10: 4
PAINLEVEL_OUTOF10: 2
PAINLEVEL_OUTOF10: 1
PAINLEVEL_OUTOF10: 5
PAINLEVEL_OUTOF10: 0
PAINLEVEL_OUTOF10: 1

## 2024-04-20 NOTE — PROGRESS NOTES
Primary Care Physician: Cl Torrez MD   Admitting Physician:  Alfa Ferreira MD  Admission date and time: 4/17/2024  5:43 PM    Room:  [address] [place] [address]  Admitting diagnosis: Septic bursitis of elbow, right [M71.121]  Olecranon bursitis of right elbow [M70.21]    Patient Name: Giana Torres  MRN: [id number]    Date of Service: 4/20/2024     Subjective:  Giana is a 68 y.o. female who was seen and examined today,4/20/2024, at the bedside.  Patient is laying down in bed she is comfortable the swelling and redness is getting better pain is still there but under reasonable control the redness at the elbow is more localized to the elbow now rather than the arm and forearm     no family present during my examination.    Review of System:   Constitutional:   Denies fever or chills, weight loss or gain, fatigue or malaise.  HEENT:   Denies ear pain, sore throat, sinus or eye problems.  Cardiovascular:   Denies any chest pain, irregular heartbeats, or palpitations.   Respiratory:   Denies shortness of breath, coughing, sputum production, hemoptysis, or wheezing.  Gastrointestinal:   Denies nausea, vomiting, diarrhea, or constipation.  Denies any abdominal pain.  Genitourinary:    Denies any urgency, frequency, hematuria. Voiding  without difficulty.  Extremities:   Denies lower extremity swelling, edema or cyanosis.  Positive for improving swelling right upper extremity with redness and pain  Neurology:    Denies any headache or focal neurological deficits, Denies generalized weakness or memory difficulty.   Psch:   Denies being anxious or depressed.  Musculoskeletal:    Denies  myalgias, joint complaints or back pain.   Integumentary:   Denies any rashes, ulcers, or excoriations.  Denies bruising.  Hematologic/Lymphatic:  Denies bruising or bleeding.    Physical Exam:  I/O this shift:  In: 180 [P.O.:180]  Out: -     Intake/Output Summary (Last 24 hours) at 4/20/2024 1637  Last data filed at 4/20/2024 1042  Gross 
4 Eyes Skin Assessment     NAME:  Giana Torres  YOB: 1955  MEDICAL RECORD NUMBER:  [id number]    The patient is being assessed for  Admission    I agree that at least one RN has performed a thorough Head to Toe Skin Assessment on the patient. ALL assessment sites listed below have been assessed.      Areas assessed by both nurses:    Head, Face, Ears, Shoulders, Back, Chest, Arms, Elbows, Hands, Sacrum. Buttock, Coccyx, Ischium, and Legs. Feet and Heels  Redness and swelling on right elbow      Does the Patient have a Wound? No noted wound(s)       Chente Prevention initiated by RN: No  Wound Care Orders initiated by RN: No    Pressure Injury (Stage 3,4, Unstageable, DTI, NWPT, and Complex wounds) if present, place Wound referral order by RN under : No    New Ostomies, if present place, Ostomy referral order under : No     Nurse 1 eSignature: Electronically signed by Dayton Gordon RN on 4/18/24 at 6:21 AM EDT    **SHARE this note so that the co-signing nurse can place an eSignature**    Nurse 2 eSignature: Electronically signed by Tom Zheng RN on 4/18/24 at 6:25 AM EDT   
Occupational Therapy    Room #:  0314/0314-02  Patient Name: Giana Torres  YOB: 1955  MRN: [id number]      Date of Service: 4/19/2024    Chart reviewed. Spoke with nursing . Patient  observed to be independent with ADLs and functional mobility.  No skilled Occupational Therapy needs at this time. Please re-order Occupational Therapy if there is a change in physical status and Occupational Therapy is needed. Thank you.     Cl Menard OTR/L; #[id number]      
Physical Therapy Initial Evaluation/Plan of Care    Room #:  0314/0314-02  Patient Name: Giana Torres  YOB: 1955  MRN: [id number]    Date of Service: 4/18/2024     Tentative placement recommendation: Home with no Physical Therapy needs  Equipment recommendation: None      Evaluating Physical Therapist: Chelsea Ragsdale, PT #[id number]      Specific Provider Orders/Date/Referring Provider :  04/17/24 2300    PT evaluation and treat  Start:  04/17/24 2300,   End:  04/17/24 2300,   ONE TIME,   Standing Count:  1 Occurrences,   R       Alfa Ferreira MD    Admitting Diagnosis:   Septic bursitis of elbow, right [M71.121]  Olecranon bursitis of right elbow [M70.21]      redness and swelling in pain to the right elbow it started about 3 days ago and the redness seems to be increasing.  Surgery: none  Visit Diagnoses         Codes    Septic olecranon bursitis of right elbow    -  Primary M71.121    Cellulitis of right upper extremity     L03.113            Patient Active Problem List   Diagnosis    Septic bursitis of elbow, right    Olecranon bursitis of right elbow    Leukocytosis    Hyponatremia    Hypokalemia    Hypertension    Depression      ASSESSMENT of Current Deficits  Safe for discharge home  All questions and concerns addressed.      PHYSICAL THERAPY  PLAN OF CARE       Patient and or family understand(s) diagnosis, prognosis, and plan of care.      Prior Level of Function: Patient ambulated independently    Rehab Potential: good   for baseline    Past medical history:   History reviewed. No pertinent past medical history.  Past Surgical History:   Procedure Laterality Date    HEMICOLECTOMY      HERNIA REPAIR      JOINT REPLACEMENT         SUBJECTIVE:    Precautions: Up as tolerated,     ,      Imaging results: XR ELBOW RIGHT (MIN 3 VIEWS)    Result Date: 4/17/2024  EXAMINATION: THREE XRAY VIEWS OF THE RIGHT ELBOW 4/17/2024 2:25 pm      1. Mild swelling of the soft tissues dorsal to the 
(Last 3 readings):   Weight   04/17/24 2132 68 kg (149 lb 14.6 oz)     Vital Signs:   Blood pressure 132/86, pulse 83, temperature 97 °F (36.1 °C), temperature source Infrared, resp. rate 18, height 1.676 m (5' 5.98\"), weight 68 kg (149 lb 14.6 oz), SpO2 96 %.    Constitutional/General: Alert and oriented x3, well appearing, non toxic in NAD  Head: NC/AT  Eyes: PERRL, EOMI  Mouth: Oropharynx clear, handling secretions, no trismus  Neck: Supple, full ROM, no meningeal signs  Pulmonary: Lungs clear to auscultation bilaterally, no wheezes, rales, or rhonchi. Not in respiratory distress  Cardiovascular:  Regular rate and rhythm, no murmurs, gallops, or rubs. 2+ distal pulses  Abdomen: Soft, non tender, non distended, bowel sounds normal  Extremities: Moves all extremities x 4. Warm and well perfused full range of motion of the right elbow is intact however patient experiences increased pain with flexion of the elbow  Skin: warm and dry erythema surrounding the right elbow extending into the upper arm and forearm, swelling present, warm to the touch  Neurologic: GCS 15  Psych: Normal Affect    Medication:  Scheduled Meds:   escitalopram  10 mg Oral Daily    amLODIPine  5 mg Oral Daily    sodium chloride flush  5-40 mL IntraVENous 2 times per day    enoxaparin  40 mg SubCUTAneous Daily    ceFAZolin  1,000 mg IntraVENous Q8H     Continuous Infusions:   sodium chloride         Objective Data:  CBC:   Lab Results   Component Value Date/Time    WBC 14.5 04/18/2024 03:16 AM    RBC 3.67 04/18/2024 03:16 AM    HGB 11.3 04/18/2024 03:16 AM    HCT 33.6 04/18/2024 03:16 AM    MCV 91.6 04/18/2024 03:16 AM    MCH 30.8 04/18/2024 03:16 AM    MCHC 33.6 04/18/2024 03:16 AM    RDW 13.1 04/18/2024 03:16 AM     04/18/2024 03:16 AM    MPV 10.0 04/18/2024 03:16 AM     CMP:    Lab Results   Component Value Date/Time     04/17/2024 06:33 PM    K 3.3 04/17/2024 06:33 PM    CL 88 04/17/2024 06:33 PM    CO2 27 04/17/2024 06:33 PM    
04/19/2024 03:29 AM    CO2 19 04/19/2024 03:29 AM    BUN 5 04/19/2024 03:29 AM    CREATININE 0.6 04/19/2024 03:29 AM    LABGLOM >90 04/19/2024 03:29 AM    GLUCOSE 95 04/19/2024 03:29 AM    PROT 6.4 04/19/2024 03:29 AM    CALCIUM 8.8 04/19/2024 03:29 AM    BILITOT 0.3 04/19/2024 03:29 AM    ALKPHOS 60 04/19/2024 03:29 AM    AST 22 04/19/2024 03:29 AM    ALT 12 04/19/2024 03:29 AM     BUN/Creatinine:    Lab Results   Component Value Date/Time    BUN 5 04/19/2024 03:29 AM    CREATININE 0.6 04/19/2024 03:29 AM       No orders to display            Assessment:  Septic olecranon bursitis of right elbow  Cellulitis of right upper extremity  Leukocytosis  Hypertension  Hyponatremia  Hypokalemia  Depression       Plan:  Admit to general med floor  Continue home medications  Continue IV Ancef  Continue IV fluids  Full code  Awaiting cultures repeat labs and monitor vitals  Appreciate orthopedic surgery consult  uric acid WNL    Continue current therapy.  See orders for further plan of care.      More than 50% of my time was spent at the bedside counseling/coordinating care with the patient and/or family with face to face contact.  This time was spent reviewing notes and laboratory data as well as instructing and counseling the patient. Time I spent with the family or surrogate(s) is included only if the patient was incapable of providing the necessary information or participating in medical decisions. I also discussed the differential diagnosis and all of the proposed management plans with the patient and individuals accompanying the patient. I am readily available for any further decision-making and intervention.       Electronically signed by Alfa Ferreira MD on 4/19/2024 at 4:23 PM

## 2024-04-21 VITALS
BODY MASS INDEX: 24.27 KG/M2 | SYSTOLIC BLOOD PRESSURE: 155 MMHG | WEIGHT: 151 LBS | TEMPERATURE: 97.9 F | HEART RATE: 64 BPM | HEIGHT: 66 IN | OXYGEN SATURATION: 100 % | RESPIRATION RATE: 14 BRPM | DIASTOLIC BLOOD PRESSURE: 96 MMHG

## 2024-04-21 LAB
ALBUMIN SERPL-MCNC: 3.7 G/DL (ref 3.5–5.2)
ALP SERPL-CCNC: 60 U/L (ref 35–104)
ALT SERPL-CCNC: 9 U/L (ref 0–32)
ANION GAP SERPL CALCULATED.3IONS-SCNC: 11 MMOL/L (ref 7–16)
AST SERPL-CCNC: 17 U/L (ref 0–31)
BILIRUB SERPL-MCNC: 0.2 MG/DL (ref 0–1.2)
BUN SERPL-MCNC: 10 MG/DL (ref 6–23)
CALCIUM SERPL-MCNC: 8.8 MG/DL (ref 8.6–10.2)
CHLORIDE SERPL-SCNC: 107 MMOL/L (ref 98–107)
CO2 SERPL-SCNC: 24 MMOL/L (ref 22–29)
CREAT SERPL-MCNC: 0.6 MG/DL (ref 0.5–1)
ERYTHROCYTE [DISTWIDTH] IN BLOOD BY AUTOMATED COUNT: 13.1 % (ref 11.5–15)
GFR SERPL CREATININE-BSD FRML MDRD: >90 ML/MIN/1.73M2
GLUCOSE SERPL-MCNC: 83 MG/DL (ref 74–99)
HCT VFR BLD AUTO: 33.7 % (ref 34–48)
HGB BLD-MCNC: 11 G/DL (ref 11.5–15.5)
MCH RBC QN AUTO: 30.6 PG (ref 26–35)
MCHC RBC AUTO-ENTMCNC: 32.6 G/DL (ref 32–34.5)
MCV RBC AUTO: 93.9 FL (ref 80–99.9)
PLATELET # BLD AUTO: 279 K/UL (ref 130–450)
PMV BLD AUTO: 10.1 FL (ref 7–12)
POTASSIUM SERPL-SCNC: 3.9 MMOL/L (ref 3.5–5)
PROT SERPL-MCNC: 6.5 G/DL (ref 6.4–8.3)
RBC # BLD AUTO: 3.59 M/UL (ref 3.5–5.5)
SODIUM SERPL-SCNC: 142 MMOL/L (ref 132–146)
WBC OTHER # BLD: 6.4 K/UL (ref 4.5–11.5)

## 2024-04-21 PROCEDURE — 2580000003 HC RX 258: Performed by: INTERNAL MEDICINE

## 2024-04-21 PROCEDURE — 85027 COMPLETE CBC AUTOMATED: CPT

## 2024-04-21 PROCEDURE — 36415 COLL VENOUS BLD VENIPUNCTURE: CPT

## 2024-04-21 PROCEDURE — 80053 COMPREHEN METABOLIC PANEL: CPT

## 2024-04-21 PROCEDURE — 6370000000 HC RX 637 (ALT 250 FOR IP): Performed by: INTERNAL MEDICINE

## 2024-04-21 PROCEDURE — 6360000002 HC RX W HCPCS: Performed by: INTERNAL MEDICINE

## 2024-04-21 RX ORDER — DOXYCYCLINE HYCLATE 100 MG
100 TABLET ORAL 2 TIMES DAILY
Qty: 10 TABLET | Refills: 0 | Status: SHIPPED | OUTPATIENT
Start: 2024-04-21 | End: 2024-05-01

## 2024-04-21 RX ORDER — AMLODIPINE BESYLATE 5 MG/1
5 TABLET ORAL DAILY
Qty: 90 TABLET | Refills: 0 | Status: SHIPPED | OUTPATIENT
Start: 2024-04-22 | End: 2024-07-21

## 2024-04-21 RX ADMIN — CEFAZOLIN 1000 MG: 1 INJECTION, POWDER, FOR SOLUTION INTRAMUSCULAR; INTRAVENOUS at 06:19

## 2024-04-21 RX ADMIN — AMLODIPINE BESYLATE 5 MG: 5 TABLET ORAL at 08:44

## 2024-04-21 RX ADMIN — Medication 10 ML: at 13:32

## 2024-04-21 RX ADMIN — CEFAZOLIN 1000 MG: 1 INJECTION, POWDER, FOR SOLUTION INTRAMUSCULAR; INTRAVENOUS at 13:32

## 2024-04-21 RX ADMIN — ESCITALOPRAM OXALATE 10 MG: 10 TABLET ORAL at 08:45

## 2024-04-21 RX ADMIN — ACETAMINOPHEN 650 MG: 325 TABLET ORAL at 06:22

## 2024-04-21 NOTE — DISCHARGE INSTRUCTIONS
Your information:  Name: Giana Torres  : 1955    Your instructions:    Weight-bear as tolerated right upper extremity   Complete antibiotics     What to do after you leave the hospital:    Recommended diet: regular diet    Recommended activity: activity as tolerated        The following personal items were collected during your admission and were returned to you:    Belongings  Dental Appliances: None  Vision - Corrective Lenses: Eyeglasses  Hearing Aid: None  Clothing: Footwear, Pants, Shirt, At bedside  Jewelry: None  Electronic Devices: Cell Phone,   Weapons (Notify Protective Services/Security): None  Home Medications: None  Valuables Given To: Patient  Provide Name(s) of Who Valuable(s) Were Given To: patient    Information obtained by:  By signing below, I understand that if any problems occur once I leave the hospital I am to contact PCP.  I understand and acknowledge receipt of the instructions indicated above.        Cellulitis: Care Instructions  Your Care Instructions     Cellulitis is a skin infection caused by bacteria, most often strep or staph. It often occurs after a break in the skin from a scrape, cut, bite, or puncture, or after a rash.  Cellulitis may be treated without doing tests to find out what caused it. But your doctor may do tests, if needed, to look for a specific bacteria, like methicillin-resistant Staphylococcus aureus (MRSA).  The doctor has checked you carefully, but problems can develop later. If you notice any problems or new symptoms, get medical treatment right away.  Follow-up care is a key part of your treatment and safety. Be sure to make and go to all appointments, and call your doctor if you are having problems. It's also a good idea to know your test results and keep a list of the medicines you take.  How can you care for yourself at home?  Take your antibiotics as directed. Do not stop taking them just because you feel better. You need to take the

## 2024-04-21 NOTE — PLAN OF CARE
Problem: Discharge Planning  Goal: Discharge to home or other facility with appropriate resources  4/21/2024 1255 by Marietta Jefferson RN  Outcome: Progressing     Problem: Pain  Goal: Verbalizes/displays adequate comfort level or baseline comfort level  4/21/2024 1255 by Marietta Jefferson RN  Outcome: Progressing     Problem: Safety - Adult  Goal: Free from fall injury  4/21/2024 1255 by Marietta Jefferson RN  Outcome: Progressing     Problem: Chronic Conditions and Co-morbidities  Goal: Patient's chronic conditions and co-morbidity symptoms are monitored and maintained or improved  4/21/2024 1255 by Marietta Jefferson RN  Outcome: Progressing

## 2024-04-21 NOTE — PLAN OF CARE
Problem: Discharge Planning  Goal: Discharge to home or other facility with appropriate resources  4/21/2024 0343 by Isreal Robison RN  Outcome: Progressing  4/20/2024 1355 by Marietta Jefferson RN  Outcome: Progressing     Problem: Pain  Goal: Verbalizes/displays adequate comfort level or baseline comfort level  4/21/2024 0343 by Isreal Robison RN  Outcome: Progressing  4/20/2024 1355 by Marietta Jefferson RN  Outcome: Progressing     Problem: Safety - Adult  Goal: Free from fall injury  4/21/2024 0343 by Isreal Robison RN  Outcome: Progressing  4/20/2024 1355 by Marietta Jefferson RN  Outcome: Progressing     Problem: Chronic Conditions and Co-morbidities  Goal: Patient's chronic conditions and co-morbidity symptoms are monitored and maintained or improved  4/21/2024 0343 by Isreal Robison RN  Outcome: Progressing  4/20/2024 1355 by Marietta Jefferson RN  Outcome: Progressing

## 2024-04-22 LAB
MICROORGANISM SPEC CULT: NORMAL
MICROORGANISM SPEC CULT: NORMAL
SERVICE CMNT-IMP: NORMAL
SERVICE CMNT-IMP: NORMAL
SPECIMEN DESCRIPTION: NORMAL
SPECIMEN DESCRIPTION: NORMAL

## 2025-03-25 ENCOUNTER — TRANSCRIBE ORDERS (OUTPATIENT)
Dept: ADMINISTRATIVE | Age: 70
End: 2025-03-25

## 2025-03-25 DIAGNOSIS — R10.11 RUQ ABDOMINAL PAIN: Primary | ICD-10-CM

## 2025-04-29 ENCOUNTER — HOSPITAL ENCOUNTER (OUTPATIENT)
Dept: CT IMAGING | Age: 70
Discharge: HOME | End: 2025-05-01
Attending: INTERNAL MEDICINE
Payer: MEDICARE

## 2025-04-29 DIAGNOSIS — R10.11 RUQ ABDOMINAL PAIN: ICD-10-CM

## 2025-04-29 PROCEDURE — 74176 CT ABD & PELVIS W/O CONTRAST: CPT

## 2025-05-24 ENCOUNTER — HOSPITAL ENCOUNTER (EMERGENCY)
Age: 70
Discharge: HOME | End: 2025-05-24
Payer: MEDICARE

## 2025-05-24 VITALS
HEART RATE: 76 BPM | WEIGHT: 150 LBS | OXYGEN SATURATION: 99 % | BODY MASS INDEX: 24.22 KG/M2 | DIASTOLIC BLOOD PRESSURE: 97 MMHG | RESPIRATION RATE: 18 BRPM | SYSTOLIC BLOOD PRESSURE: 145 MMHG | TEMPERATURE: 98.3 F

## 2025-05-24 DIAGNOSIS — J01.00 ACUTE NON-RECURRENT MAXILLARY SINUSITIS: Primary | ICD-10-CM

## 2025-05-24 PROCEDURE — 99211 OFF/OP EST MAY X REQ PHY/QHP: CPT

## 2025-05-24 RX ORDER — AMOXICILLIN 500 MG/1
500 CAPSULE ORAL 3 TIMES DAILY
Qty: 21 CAPSULE | Refills: 0 | Status: SHIPPED | OUTPATIENT
Start: 2025-05-24 | End: 2025-05-31

## 2025-05-24 RX ORDER — FLUTICASONE PROPIONATE 50 MCG
2 SPRAY, SUSPENSION (ML) NASAL DAILY
Qty: 16 G | Refills: 0 | Status: SHIPPED | OUTPATIENT
Start: 2025-05-24

## 2025-05-24 ASSESSMENT — PAIN - FUNCTIONAL ASSESSMENT: PAIN_FUNCTIONAL_ASSESSMENT: NONE - DENIES PAIN

## 2025-05-24 NOTE — DISCHARGE INSTRUCTIONS
Take all medication as ordered  Complete all antibiotics-even when feeling better  Increase fluids  Avoid alcohol  Honey for cough, cough drops, hard candy

## 2025-05-24 NOTE — ED PROVIDER NOTES
Independent [facility] Visit.    [facility]  ED  Encounter Note  Admit Date/RoomTime: 2025  8:21 AM  ED Room:   NAME: Giana Torres  : 1955  MRN: [id number]  PCP: Cl Torrez MD    CHIEF COMPLAINT     Cough (Cough & congestion for 5 days)    HISTORY OF PRESENT ILLNESS        Giana Torres is a 70 y.o. female who presents to the Urgent Care  cough and congestion for 5 days. Pt reports sinus pressure and productive cough. Pt has taken OTC cough medication with no improvement. Pt denies fevers and chills and does have sick contacts.     REVIEW OF SYSTEMS     Pertinent positives and negatives are stated within HPI, all other systems reviewed and are negative.    Past Medical History:  has no past medical history on file.  Surgical History:  has a past surgical history that includes hemicolectomy; hernia repair; and joint replacement.  Social History:  reports that she has never smoked. She has never used smokeless tobacco. She reports current alcohol use. She reports that she does not use drugs.  Family History: family history is not on file.   Allergies: Patient has no known allergies.  CURRENT MEDICATIONS       Discharge Medication List as of 2025  8:41 AM        CONTINUE these medications which have NOT CHANGED    Details   amLODIPine (NORVASC) 5 MG tablet Take 1 tablet by mouth daily, Disp-90 tablet, R-0Normal      meloxicam (MOBIC) 15 MG tablet Take 1 tablet by mouth dailyHistorical Med      Escitalopram Oxalate (LEXAPRO PO) Take by mouthHistorical Med             SCREENINGS               CIWA Assessment  BP: (!) 145/97  Pulse: 76       PHYSICAL EXAM   Oxygen Saturation Interpretation: Normal on room air analysis.        ED Triage Vitals   BP Systolic BP Percentile Diastolic BP Percentile Temp Temp src Pulse Resp SpO2   -- -- -- -- -- -- -- --      Height Weight         -- --             Constitutional/General: Alert and oriented x3, well appearing, non  discussed today’s results, in addition to providing specific details for the plan of care and counseling regarding the diagnosis and prognosis and are agreeable with the plan. All results reviewed with pt and all questions answered.  I discussed the differential, results and discharge plan with the patient and/or family/friend/caregiver if present.  I emphasized the importance of follow-up with the physician I referred them to in the timeframe recommended.  I explained reasons for the patient to return to the Emergency Department. Additional verbal discharge instructions were also given and discussed with the patient to supplement those generated by the EMR. We also discussed medications that were prescribed (if any) including common side effects and interactions. The patient was advised to abstain from driving, operating heavy machinery or making significant decisions while taking medications such as opiates and muscle relaxers that may impair this. All questions were addressed.  They understand return precautions and discharge instructions. The patient and/or family/friend/caregiver expressed understanding. Vitals were stable and they were in no distress at discharge. Findings were discussed with the patient and reasons to immediately return to the ED were articulated to them.     I used an evidence-based tool along with my training and experience to weigh the risk of discharge against the risks of further testing, imaging, or hospitalization. At this time, I estimate the risks of additional testing, imaging, or hospitalization to be equal to or greater than the risk of discharge.  I discussed my risk assessment with the patient and the patient consents to the risk of discharge as well as the risk of uncertainty in estimating outcomes. At this time, the risk of acute decompensation with death before the patient can return for re-evaluation is most likely lower than the risk of death attributable to being in the